# Patient Record
Sex: MALE | Race: WHITE | NOT HISPANIC OR LATINO | URBAN - METROPOLITAN AREA
[De-identification: names, ages, dates, MRNs, and addresses within clinical notes are randomized per-mention and may not be internally consistent; named-entity substitution may affect disease eponyms.]

---

## 2024-11-07 ENCOUNTER — APPOINTMENT (OUTPATIENT)
Dept: PRIMARY CARE | Facility: CLINIC | Age: 49
End: 2024-11-07
Payer: COMMERCIAL

## 2024-12-23 ENCOUNTER — APPOINTMENT (OUTPATIENT)
Dept: PRIMARY CARE | Facility: CLINIC | Age: 49
End: 2024-12-23
Payer: COMMERCIAL

## 2025-04-29 ENCOUNTER — APPOINTMENT (OUTPATIENT)
Dept: PRIMARY CARE | Facility: CLINIC | Age: 50
End: 2025-04-29
Payer: COMMERCIAL

## 2025-04-29 VITALS
BODY MASS INDEX: 27.07 KG/M2 | HEIGHT: 71 IN | WEIGHT: 193.4 LBS | SYSTOLIC BLOOD PRESSURE: 123 MMHG | OXYGEN SATURATION: 96 % | RESPIRATION RATE: 18 BRPM | HEART RATE: 95 BPM | DIASTOLIC BLOOD PRESSURE: 85 MMHG

## 2025-04-29 DIAGNOSIS — Z12.11 ENCOUNTER FOR SCREENING FOR MALIGNANT NEOPLASM OF COLON: ICD-10-CM

## 2025-04-29 DIAGNOSIS — Z12.5 SCREENING FOR PROSTATE CANCER: ICD-10-CM

## 2025-04-29 DIAGNOSIS — Z00.00 ANNUAL PHYSICAL EXAM: Primary | ICD-10-CM

## 2025-04-29 DIAGNOSIS — E78.5 DYSLIPIDEMIA: ICD-10-CM

## 2025-04-29 DIAGNOSIS — I10 PRIMARY HYPERTENSION: ICD-10-CM

## 2025-04-29 PROCEDURE — 3079F DIAST BP 80-89 MM HG: CPT | Performed by: INTERNAL MEDICINE

## 2025-04-29 PROCEDURE — 99386 PREV VISIT NEW AGE 40-64: CPT | Performed by: INTERNAL MEDICINE

## 2025-04-29 PROCEDURE — 3008F BODY MASS INDEX DOCD: CPT | Performed by: INTERNAL MEDICINE

## 2025-04-29 PROCEDURE — 3074F SYST BP LT 130 MM HG: CPT | Performed by: INTERNAL MEDICINE

## 2025-04-29 PROCEDURE — 1036F TOBACCO NON-USER: CPT | Performed by: INTERNAL MEDICINE

## 2025-04-29 RX ORDER — METHOCARBAMOL 500 MG/1
500 TABLET, FILM COATED ORAL 4 TIMES DAILY
COMMUNITY
Start: 2021-04-03 | End: 2025-04-29 | Stop reason: WASHOUT

## 2025-04-29 RX ORDER — AMLODIPINE BESYLATE 5 MG/1
1 TABLET ORAL
COMMUNITY
Start: 2025-03-12

## 2025-04-29 RX ORDER — SILDENAFIL 50 MG/1
50 TABLET, FILM COATED ORAL AS NEEDED
COMMUNITY
Start: 2024-10-09 | End: 2025-04-29 | Stop reason: WASHOUT

## 2025-04-29 ASSESSMENT — ENCOUNTER SYMPTOMS
DIZZINESS: 0
FATIGUE: 0
SHORTNESS OF BREATH: 0
CONSTIPATION: 0
HEADACHES: 0
BLOOD IN STOOL: 0
SLEEP DISTURBANCE: 0

## 2025-04-29 NOTE — PROGRESS NOTES
"Subjective   Patient ID: Natalio Schofield is a 49 y.o. male who presents for Establish Care.    Pt presents to get established. Last PCP was 10-15 years ago. Saw a  group briefly over the holidays d/t new diagnosis of HTN before coming in today.    PMH:  -HTN: Controlled on amlodipine.  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 Has had 22 surgeries done on his eyes since he was born. Most recently got sclerotherapy at [facility].    Sleeps on average 7-8 hours a night. 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        Review of Systems   Constitutional:  Negative for fatigue.   Respiratory:  Negative for shortness of breath.    Cardiovascular:  Negative for chest pain.   Gastrointestinal:  Negative for blood in stool and constipation.   Neurological:  Negative for dizziness and headaches.   Psychiatric/Behavioral:  Negative for sleep disturbance.        /85 (BP Location: Right arm, Patient Position: Sitting)   Pulse 95   Resp 18   Ht 1.803 m (5' 11\")   Wt 87.7 kg (193 lb 6.4 oz)   SpO2 96%   BMI 26.97 kg/m²   Objective   Physical Exam  Constitutional:       General: He is not in acute distress.     Appearance: He is not ill-appearing, toxic-appearing or diaphoretic.   HENT:      Head: Normocephalic and atraumatic.   Eyes:      Conjunctiva/sclera: Conjunctivae normal.   Cardiovascular:      Rate and Rhythm: Normal rate and regular rhythm.      Heart sounds: No murmur heard.     No friction rub. No gallop.   Pulmonary:      Effort: Pulmonary effort is normal. No respiratory distress.      Breath sounds: No stridor. No wheezing, rhonchi or rales.   Abdominal:      General: Abdomen is flat. Bowel sounds are normal. There is no distension.      Palpations: Abdomen is soft.      Tenderness: There is no abdominal tenderness. There is no guarding.   Neurological:      Mental Status: He is alert.         Assessment/Plan   Problem List Items " Addressed This Visit           ICD-10-CM    Dyslipidemia E78.5    Pt had lipids checked in the last 6 months and knows that his LDL was in the 150s and HDL was in the 30s. Recommended from  group to get CT cardiac score next but hasn't scheduled it.  -I reviewed the significance of high cholesterol and what it does. Pt agreeable with getting CT cardiac score. In the meantime discussed diet/exercise changes pt can make which would help with numbers.   -Will follow up after CT cardiac score done to let pt know if we need medication or not.         Relevant Orders    CT cardiac scoring wo IV contrast    Primary hypertension I10    -Controlled on amlodipine 5mg daily. Pt to check at home and let us know if it gets worse.          Other Visit Diagnoses         Codes      Annual physical exam    -  Primary Z00.00      Screening for prostate cancer     Z12.5    Relevant Orders    PSA      Encounter for screening for malignant neoplasm of colon     Z12.11    Relevant Orders    Cologuard® colon cancer screening        -Labwork as above.  -Discussed TDAP. Pt to check records and get it done if need be.  -Discussed colon cancer screening options such as colonoscopy and cologuard. Pt opted for cologuard. Has no FMH of colon cancer.         Katarzyna Gipson MD 04/29/25 8:47 AM

## 2025-04-29 NOTE — ASSESSMENT & PLAN NOTE
Pt had lipids checked in the last 6 months and knows that his LDL was in the 150s and HDL was in the 30s. Recommended from  group to get CT cardiac score next but hasn't scheduled it.  -I reviewed the significance of high cholesterol and what it does. Pt agreeable with getting CT cardiac score. In the meantime discussed diet/exercise changes pt can make which would help with numbers.   -Will follow up after CT cardiac score done to let pt know if we need medication or not.

## 2025-05-06 LAB — NONINV COLON CA DNA+OCC BLD SCRN STL QL: POSITIVE

## 2025-05-07 DIAGNOSIS — Z12.11 ENCOUNTER FOR SCREENING FOR MALIGNANT NEOPLASM OF COLON: Primary | ICD-10-CM

## 2026-04-30 ENCOUNTER — APPOINTMENT (OUTPATIENT)
Dept: PRIMARY CARE | Facility: CLINIC | Age: 51
End: 2026-04-30
Payer: COMMERCIAL